# Patient Record
Sex: MALE | Race: WHITE | URBAN - METROPOLITAN AREA
[De-identification: names, ages, dates, MRNs, and addresses within clinical notes are randomized per-mention and may not be internally consistent; named-entity substitution may affect disease eponyms.]

---
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
## 2019-09-13 ENCOUNTER — HISTORICAL (OUTPATIENT)
Dept: RADIOLOGY | Facility: HOSPITAL | Age: 19
End: 2019-09-13

## 2020-09-03 ENCOUNTER — HISTORICAL (OUTPATIENT)
Dept: RADIOLOGY | Facility: HOSPITAL | Age: 20
End: 2020-09-03

## 2020-11-16 LAB
ABS NEUT (OLG): 2.04 X10(3)/MCL (ref 2.1–9.2)
APTT PPP: 30.9 SECOND(S) (ref 23.2–33.7)
BASOPHILS # BLD AUTO: 0 X10(3)/MCL (ref 0–0.2)
BASOPHILS NFR BLD AUTO: 1 %
EOSINOPHIL # BLD AUTO: 0.1 X10(3)/MCL (ref 0–0.9)
EOSINOPHIL NFR BLD AUTO: 2 %
ERYTHROCYTE [DISTWIDTH] IN BLOOD BY AUTOMATED COUNT: 12.6 % (ref 11.5–17)
HCT VFR BLD AUTO: 43.9 % (ref 42–52)
HGB BLD-MCNC: 14.5 GM/DL (ref 14–18)
INR PPP: 1 (ref 0–1.3)
LYMPHOCYTES # BLD AUTO: 1.7 X10(3)/MCL (ref 0.6–4.6)
LYMPHOCYTES NFR BLD AUTO: 39 %
MCH RBC QN AUTO: 30.5 PG (ref 27–31)
MCHC RBC AUTO-ENTMCNC: 33 GM/DL (ref 33–36)
MCV RBC AUTO: 92.2 FL (ref 80–94)
MONOCYTES # BLD AUTO: 0.5 X10(3)/MCL (ref 0.1–1.3)
MONOCYTES NFR BLD AUTO: 12 %
NEUTROPHILS # BLD AUTO: 2.04 X10(3)/MCL (ref 2.1–9.2)
NEUTROPHILS NFR BLD AUTO: 47 %
PLATELET # BLD AUTO: 214 X10(3)/MCL (ref 130–400)
PMV BLD AUTO: 10.5 FL (ref 9.4–12.4)
PROTHROMBIN TIME: 13.3 SECOND(S) (ref 11.1–13.7)
RBC # BLD AUTO: 4.76 X10(6)/MCL (ref 4.7–6.1)
WBC # SPEC AUTO: 4.4 X10(3)/MCL (ref 4.5–11.5)

## 2020-11-19 ENCOUNTER — HISTORICAL (OUTPATIENT)
Dept: SURGERY | Facility: HOSPITAL | Age: 20
End: 2020-11-19

## 2020-11-22 ENCOUNTER — HISTORICAL (OUTPATIENT)
Dept: SURGERY | Facility: HOSPITAL | Age: 20
End: 2020-11-22

## 2022-04-29 NOTE — OP NOTE
DATE OF SURGERY:    11/18/2020    SURGEON:  Steve Walls MD    PREOPERATIVE DIAGNOSIS:  Chronic obstructive adenotonsillitis.    POSTOPERATIVE DIAGNOSIS:  Chronic obstructive adenotonsillitis.    OPERATION PERFORMED:  KTP laser tonsillectomy, adenoidectomy.    DESCRIPTION OF PROCEDURE:  With proper consent information, patient brought to the operating room, placed on operating table in supine position.  Satisfactory endotracheal intubation general anesthesia, patient was placed asleep.  The tonsils and adenoids were removed with the KTP laser set on continuous wattage of power.  Strict KTP laser protocol was followed.  There was no bleeding.  He tolerated the procedure very well.  He was transferred back to recovery room awake, alert and responsive.        ______________________________  Steve Walls MD    [facility]  DD:  11/23/2020  Time:  04:03PM  DT:  11/23/2020  Time:  06:20PM  Job #:  [id number]

## 2025-05-06 ENCOUNTER — CLINICAL SUPPORT (OUTPATIENT)
Dept: RESPIRATORY THERAPY | Facility: HOSPITAL | Age: 25
End: 2025-05-06
Attending: SPECIALIST
Payer: COMMERCIAL

## 2025-05-06 ENCOUNTER — HOSPITAL ENCOUNTER (OUTPATIENT)
Dept: RADIOLOGY | Facility: HOSPITAL | Age: 25
Discharge: HOME | End: 2025-05-06
Attending: SPECIALIST
Payer: COMMERCIAL

## 2025-05-06 DIAGNOSIS — M70.51 PATELLAR BURSITIS, RIGHT: ICD-10-CM

## 2025-05-06 LAB
OHS QRS DURATION: 90 MS
OHS QTC CALCULATION: 391 MS

## 2025-05-06 PROCEDURE — 93010 ELECTROCARDIOGRAM REPORT: CPT | Mod: ,,, | Performed by: INTERNAL MEDICINE

## 2025-05-06 PROCEDURE — 93005 ELECTROCARDIOGRAM TRACING: CPT

## 2025-05-06 NOTE — DISCHARGE INSTRUCTIONS
Nothing to eat or drink after midnight.       WOUND CARE  INSTRUCTIONS     Az Deras M.D.     Orthopedic Surgery/Sports Medicine     [address] [facility], [place] [address]     [place] [id number]     Office: [phone]     REMOVE BANDAGE FROM SURGERY SITE 2 DAYS AFTER SURGERY   UNLESS INSTRUCTED OTHERWISE.    2. YOU MUST KEEP A STERILE BANDAGE OR GAUZE OVER SURGERY SITE UNTIL   THERE IS NO DRAINAGE FROM WOUND.    3. WASH INCISION WITH ANTIBACTERIAL SOAP AND WATER DAILY.    4. DO NOT SOAK INCISION IN BATHTUB.    5. REFRAIN FROM USING HYDROGEN PEROXIDE OR ANTIBIOTIC   OINTMENT UNLESS INSTRUCTED TO DO SO.    6. CALL DR DERAS OR NOTIFY STAFF IN CASE OF FEVER-GREATER THAN 101.5,   EXCESSIVE DRAINAGE FROM SURGERY SITE, REDNESS OR SEVERE PAIN.

## 2025-05-07 ENCOUNTER — ANESTHESIA EVENT (OUTPATIENT)
Dept: SURGERY | Facility: HOSPITAL | Age: 25
End: 2025-05-07
Payer: COMMERCIAL

## 2025-05-07 NOTE — ANESTHESIA PREPROCEDURE EVALUATION
05/07/2025  Fabricio Serrano is a 24 y.o., male.      Pre-op Assessment    I have reviewed the Patient Summary Reports.     I have reviewed the Nursing Notes. I have reviewed the NPO Status.   I have reviewed the Medications.     Review of Systems  Anesthesia Hx:             Denies Family Hx of Anesthesia complications.    Denies Personal Hx of Anesthesia complications.                    Social:  Non-Smoker, Alcohol Use       Hematology/Oncology:  Hematology Normal   Oncology Normal                                   EENT/Dental:  EENT/Dental Normal           Cardiovascular:  Cardiovascular Normal                  ECG has been reviewed.                            Pulmonary:  Pulmonary Normal                       Renal/:  Renal/ Normal                 Hepatic/GI:  Hepatic/GI Normal                    Musculoskeletal:  Musculoskeletal Normal                Neurological:  Neurology Normal                                      Endocrine:  Endocrine Normal            Dermatological:  Skin Normal    Psych:  Psychiatric Normal                    Physical Exam  General: Cooperative, Alert and Oriented    Airway:  Mallampati: II   Mouth Opening: Normal  TM Distance: Normal  Tongue: Normal  Neck ROM: Normal ROM    Dental:  Intact        Anesthesia Plan  Type of Anesthesia, risks & benefits discussed:    Anesthesia Type: Gen Supraglottic Airway  Intra-op Monitoring Plan: Standard ASA Monitors  Post Op Pain Control Plan: multimodal analgesia  Induction:  IV  Airway Plan: Direct  Informed Consent: Informed consent signed with the Patient and all parties understand the risks and agree with anesthesia plan.  All questions answered. Patient consented to blood products? Yes  ASA Score: 1    Ready For Surgery From Anesthesia Perspective.     .

## 2025-05-08 ENCOUNTER — HOSPITAL ENCOUNTER (OUTPATIENT)
Facility: HOSPITAL | Age: 25
Discharge: HOME | End: 2025-05-08
Attending: SPECIALIST | Admitting: SPECIALIST
Payer: COMMERCIAL

## 2025-05-08 ENCOUNTER — ANESTHESIA (OUTPATIENT)
Dept: SURGERY | Facility: HOSPITAL | Age: 25
End: 2025-05-08
Payer: COMMERCIAL

## 2025-05-08 VITALS
DIASTOLIC BLOOD PRESSURE: 70 MMHG | HEART RATE: 50 BPM | WEIGHT: 202 LBS | RESPIRATION RATE: 18 BRPM | SYSTOLIC BLOOD PRESSURE: 112 MMHG | BODY MASS INDEX: 27.36 KG/M2 | HEIGHT: 72 IN | TEMPERATURE: 98 F | OXYGEN SATURATION: 99 %

## 2025-05-08 DIAGNOSIS — M70.51 PATELLAR BURSITIS, RIGHT: ICD-10-CM

## 2025-05-08 PROCEDURE — 71000016 HC POSTOP RECOV ADDL HR: Performed by: SPECIALIST

## 2025-05-08 PROCEDURE — 37000008 HC ANESTHESIA 1ST 15 MINUTES: Performed by: SPECIALIST

## 2025-05-08 PROCEDURE — 36000709 HC OR TIME LEV III EA ADD 15 MIN: Performed by: SPECIALIST

## 2025-05-08 PROCEDURE — 63600175 PHARM REV CODE 636 W HCPCS: Performed by: SPECIALIST

## 2025-05-08 PROCEDURE — 63600175 PHARM REV CODE 636 W HCPCS: Performed by: ANESTHESIOLOGY

## 2025-05-08 PROCEDURE — 71000033 HC RECOVERY, INTIAL HOUR: Performed by: SPECIALIST

## 2025-05-08 PROCEDURE — 63600175 PHARM REV CODE 636 W HCPCS: Performed by: NURSE ANESTHETIST, CERTIFIED REGISTERED

## 2025-05-08 PROCEDURE — 71000015 HC POSTOP RECOV 1ST HR: Performed by: SPECIALIST

## 2025-05-08 PROCEDURE — 25000003 PHARM REV CODE 250: Performed by: SPECIALIST

## 2025-05-08 PROCEDURE — 37000009 HC ANESTHESIA EA ADD 15 MINS: Performed by: SPECIALIST

## 2025-05-08 PROCEDURE — 36000708 HC OR TIME LEV III 1ST 15 MIN: Performed by: SPECIALIST

## 2025-05-08 RX ORDER — SODIUM CHLORIDE 0.9 % (FLUSH) 0.9 %
10 SYRINGE (ML) INJECTION
Status: DISCONTINUED | OUTPATIENT
Start: 2025-05-08 | End: 2025-05-08

## 2025-05-08 RX ORDER — LIDOCAINE HYDROCHLORIDE 20 MG/ML
INJECTION INTRAVENOUS
Status: DISCONTINUED | OUTPATIENT
Start: 2025-05-08 | End: 2025-05-08

## 2025-05-08 RX ORDER — HYDROMORPHONE HYDROCHLORIDE 2 MG/ML
0.2 INJECTION, SOLUTION INTRAMUSCULAR; INTRAVENOUS; SUBCUTANEOUS EVERY 5 MIN PRN
Status: DISCONTINUED | OUTPATIENT
Start: 2025-05-08 | End: 2025-05-08

## 2025-05-08 RX ORDER — BUPIVACAINE HYDROCHLORIDE 5 MG/ML
INJECTION, SOLUTION EPIDURAL; INTRACAUDAL; PERINEURAL
Status: DISCONTINUED | OUTPATIENT
Start: 2025-05-08 | End: 2025-05-08 | Stop reason: HOSPADM

## 2025-05-08 RX ORDER — PROPOFOL 10 MG/ML
INJECTION, EMULSION INTRAVENOUS
Status: DISCONTINUED | OUTPATIENT
Start: 2025-05-08 | End: 2025-05-08

## 2025-05-08 RX ORDER — GLUCAGON 1 MG
1 KIT INJECTION
Status: DISCONTINUED | OUTPATIENT
Start: 2025-05-08 | End: 2025-05-08

## 2025-05-08 RX ORDER — DEXAMETHASONE SODIUM PHOSPHATE 4 MG/ML
INJECTION, SOLUTION INTRA-ARTICULAR; INTRALESIONAL; INTRAMUSCULAR; INTRAVENOUS; SOFT TISSUE
Status: DISCONTINUED | OUTPATIENT
Start: 2025-05-08 | End: 2025-05-08

## 2025-05-08 RX ORDER — KETOROLAC TROMETHAMINE 30 MG/ML
INJECTION, SOLUTION INTRAMUSCULAR; INTRAVENOUS
Status: DISCONTINUED | OUTPATIENT
Start: 2025-05-08 | End: 2025-05-08

## 2025-05-08 RX ORDER — ONDANSETRON HYDROCHLORIDE 2 MG/ML
INJECTION, SOLUTION INTRAVENOUS
Status: DISCONTINUED | OUTPATIENT
Start: 2025-05-08 | End: 2025-05-08

## 2025-05-08 RX ORDER — MIDAZOLAM HYDROCHLORIDE 1 MG/ML
INJECTION INTRAMUSCULAR; INTRAVENOUS
Status: DISCONTINUED | OUTPATIENT
Start: 2025-05-08 | End: 2025-05-08

## 2025-05-08 RX ORDER — SODIUM CHLORIDE, SODIUM LACTATE, POTASSIUM CHLORIDE, CALCIUM CHLORIDE 600; 310; 30; 20 MG/100ML; MG/100ML; MG/100ML; MG/100ML
INJECTION, SOLUTION INTRAVENOUS CONTINUOUS
Status: DISCONTINUED | OUTPATIENT
Start: 2025-05-08 | End: 2025-05-08 | Stop reason: HOSPADM

## 2025-05-08 RX ORDER — CEFADROXIL 500 MG/1
1 CAPSULE ORAL EVERY 12 HOURS
Qty: 14 CAPSULE | Refills: 0 | Status: SHIPPED | OUTPATIENT
Start: 2025-05-08 | End: 2025-05-15

## 2025-05-08 RX ORDER — HYDROCODONE BITARTRATE AND ACETAMINOPHEN 7.5; 325 MG/1; MG/1
1 TABLET ORAL EVERY 6 HOURS PRN
Qty: 14 TABLET | Refills: 0 | Status: SHIPPED | OUTPATIENT
Start: 2025-05-08

## 2025-05-08 RX ORDER — CEFAZOLIN SODIUM 2 G/50ML
2 SOLUTION INTRAVENOUS
Status: COMPLETED | OUTPATIENT
Start: 2025-05-08 | End: 2025-05-08

## 2025-05-08 RX ORDER — LIDOCAINE HYDROCHLORIDE 10 MG/ML
INJECTION, SOLUTION INFILTRATION; PERINEURAL
Status: DISCONTINUED | OUTPATIENT
Start: 2025-05-08 | End: 2025-05-08 | Stop reason: HOSPADM

## 2025-05-08 RX ORDER — FENTANYL CITRATE 50 UG/ML
INJECTION, SOLUTION INTRAMUSCULAR; INTRAVENOUS
Status: DISCONTINUED | OUTPATIENT
Start: 2025-05-08 | End: 2025-05-08

## 2025-05-08 RX ORDER — MUPIROCIN 20 MG/G
OINTMENT TOPICAL
Status: DISCONTINUED | OUTPATIENT
Start: 2025-05-08 | End: 2025-05-08 | Stop reason: HOSPADM

## 2025-05-08 RX ADMIN — HYDROMORPHONE HYDROCHLORIDE 0.2 MG: 2 INJECTION INTRAMUSCULAR; INTRAVENOUS; SUBCUTANEOUS at 09:05

## 2025-05-08 RX ADMIN — DEXAMETHASONE SODIUM PHOSPHATE 8 MG: 4 INJECTION, SOLUTION INTRA-ARTICULAR; INTRALESIONAL; INTRAMUSCULAR; INTRAVENOUS; SOFT TISSUE at 08:05

## 2025-05-08 RX ADMIN — PROPOFOL 200 MG: 10 INJECTION, EMULSION INTRAVENOUS at 08:05

## 2025-05-08 RX ADMIN — LIDOCAINE HYDROCHLORIDE 100 MG: 20 INJECTION, SOLUTION INTRAVENOUS at 08:05

## 2025-05-08 RX ADMIN — FENTANYL CITRATE 50 MCG: 50 INJECTION, SOLUTION INTRAMUSCULAR; INTRAVENOUS at 08:05

## 2025-05-08 RX ADMIN — SODIUM CHLORIDE, POTASSIUM CHLORIDE, SODIUM LACTATE AND CALCIUM CHLORIDE: 600; 310; 30; 20 INJECTION, SOLUTION INTRAVENOUS at 06:05

## 2025-05-08 RX ADMIN — ONDANSETRON 4 MG: 2 INJECTION INTRAMUSCULAR; INTRAVENOUS at 08:05

## 2025-05-08 RX ADMIN — FENTANYL CITRATE 25 MCG: 50 INJECTION, SOLUTION INTRAMUSCULAR; INTRAVENOUS at 09:05

## 2025-05-08 RX ADMIN — MUPIROCIN 1 G: 20 OINTMENT TOPICAL at 06:05

## 2025-05-08 RX ADMIN — PROPOFOL 100 MG: 10 INJECTION, EMULSION INTRAVENOUS at 08:05

## 2025-05-08 RX ADMIN — FENTANYL CITRATE 25 MCG: 50 INJECTION, SOLUTION INTRAMUSCULAR; INTRAVENOUS at 08:05

## 2025-05-08 RX ADMIN — KETOROLAC TROMETHAMINE 15 MG: 30 INJECTION, SOLUTION INTRAMUSCULAR at 08:05

## 2025-05-08 RX ADMIN — MIDAZOLAM 2 MG: 1 INJECTION INTRAMUSCULAR; INTRAVENOUS at 08:05

## 2025-05-08 RX ADMIN — CEFAZOLIN SODIUM 2 G: 2 SOLUTION INTRAVENOUS at 08:05

## 2025-05-08 NOTE — OP NOTE
Operative note     Date: 5/8/2025     Preop diagnosis:  Right knee prepatellar bursitis    Postop diagnosis: same    Procedure:  Excision of prepatellar bursa    Surgeon: Az Phillips M.D.    Assistant:     Anesthesia:  General    Complications: none    Blood loss: nil    Procedure in detail:  Informed consent was obtained.  Risks of the procedure was explained not excluding infection, bleeding, pain, scarring, neurovascular injury, recurrence.  This is a 24-year-old healthy male with a an enlarging mass firm but consistent with a prepatellar bursal sac over the knee.  He was given general anesthesia prepped and draped.  Anterior incision was made a very thickened bursal sac was excised over the patella and patella tendon.  Sent for specimen.  Bovie was used to cauterize wound was thoroughly irrigated and closed with 2-0 Vicryl staples sterile dressing applied no complications.    Az Phillips M.D.

## 2025-05-08 NOTE — ANESTHESIA PROCEDURE NOTES
IGel placement    Date/Time: 5/8/2025 8:23 AM    Performed by: Soledad De Jesus CRNA  Authorized by: Soledad De Jesus CRNA    Intubation:     Induction:  Intravenous    Intubated:  Postinduction    Mask Ventilation:  Easy mask    Attempts:  1    Attempted By:  CRNA    Difficult Airway Encountered?: No      Complications:  None    Airway Device:  Supraglottic airway/LMA    Airway Device Size:  4.0    Style/Cuff Inflation:  Uncuffed    Placement Verified By:  Capnometry    Complicating Factors:  None    Findings Post-Intubation:  BS equal bilateral

## 2025-05-08 NOTE — ANESTHESIA POSTPROCEDURE EVALUATION
Anesthesia Post Evaluation    Patient: Fabricio Serrano    Procedure(s) Performed: Procedure(s) (LRB):  EXCISION, PREPATELLAR BURSA (Right)    Final Anesthesia Type: general      Patient location during evaluation: PACU  Patient participation: Yes- Able to Participate  Level of consciousness: awake  Post-procedure vital signs: reviewed and stable  Pain management: adequate  Airway patency: patent  LISA mitigation strategies: Multimodal analgesia  PONV status at discharge: No PONV  Anesthetic complications: no      Cardiovascular status: hemodynamically stable  Respiratory status: unassisted, room air and spontaneous ventilation  Hydration status: euvolemic  Follow-up not needed.              Vitals Value Taken Time   /47 05/08/25 09:10   Temp  05/08/25 09:11   Pulse 54 05/08/25 09:11   Resp 10 05/08/25 09:11   SpO2 96 % 05/08/25 09:11   Vitals shown include unfiled device data.      No case tracking events are documented in the log.      Pain/Osmani Score: Osmani Score: 6 (5/8/2025  9:04 AM)

## 2025-05-08 NOTE — DISCHARGE SUMMARY
[facility] - Periop Services  Discharge Note  Short Stay    Procedure(s) (LRB):  EXCISION, PREPATELLAR BURSA (Right)      OUTCOME: Condition has improved and patient is now ready for discharge.    DISPOSITION: Home or Self Care    FINAL DIAGNOSIS:  <principal problem not specified>    FOLLOWUP: In clinic    DISCHARGE INSTRUCTIONS:  No discharge procedures on file.      Clinical Reference Documents Added to Patient Instructions         Document    WOUND CARE DISCHARGE INSTRUCTIONS (ENGLISH)            TIME SPENT ON DISCHARGE: 20 minutes

## 2025-05-08 NOTE — TRANSFER OF CARE
Anesthesia Transfer of Care Note    Patient: Fabricio Serrano    Procedure(s) Performed: Procedure(s) (LRB):  EXCISION, PREPATELLAR BURSA (Right)    Patient location: PACU    Anesthesia Type: general    Transport from OR: Transported from OR on room air with adequate spontaneous ventilation    Post pain: adequate analgesia    Post assessment: no apparent anesthetic complications    Post vital signs: stable    Level of consciousness: responds to stimulation and sedated    Nausea/Vomiting: no nausea/vomiting    Complications: none    Transfer of care protocol was followed      Last vitals: Visit Vitals  /67 (BP Location: Left arm, Patient Position: Sitting)   Pulse (!) 49   Temp 36.4 °C (97.6 °F) (Oral)   Resp 20   Ht 6' (1.829 m)   Wt 91.6 kg (202 lb)   SpO2 98%   BMI 27.40 kg/m²

## 2025-05-12 LAB — PSYCHE PATHOLOGY RESULT: NORMAL

## (undated) DEVICE — SOL IRR SOD CHL .9% POUR

## (undated) DEVICE — PAD ELECTROSURGICAL SPL W/CORD

## (undated) DEVICE — DURAPREP SURG SCRUB 26ML

## (undated) DEVICE — SUT 4.0 ETHILON

## (undated) DEVICE — GLOVE SENSICARE PI GRN 7

## (undated) DEVICE — PAD ABDOMINAL STERILE 8X10IN

## (undated) DEVICE — GLOVE SENSICARE NEOPRENE 6.5

## (undated) DEVICE — SUT 2-0 VICRYL / SH (J417)

## (undated) DEVICE — Device

## (undated) DEVICE — DRESSING N ADH OIL EMUL 3X3

## (undated) DEVICE — BANDAGE MATRIX HK LOOP 6IN 5YD

## (undated) DEVICE — PENCIL ZIP PEN EVAC 22MM 10FT

## (undated) DEVICE — BLADE SURG CARBON STEEL #10

## (undated) DEVICE — STAPLER SKIN ROTATING HEAD

## (undated) DEVICE — SUPPORT ULNA NERVE PROTECTOR

## (undated) DEVICE — SOCKINETTE IMPERVIOUS 12X48IN

## (undated) DEVICE — PAD KNEE POLAR XL

## (undated) DEVICE — SPONGE GZ WOVEN 12-PLY 4X4IN

## (undated) DEVICE — POSITIONER HEEL FOAM CONVOLTD

## (undated) DEVICE — BANDAGE ESMARK ELASTIC ST 6X9

## (undated) DEVICE — GLOVE SIGNATURE ESSNTL LTX 8

## (undated) DEVICE — GLOVE SENSICARE PI GRN 7.5

## (undated) DEVICE — GOWN POLY REINF BRTH SLV XL

## (undated) DEVICE — WRAP COBAN NL STRL 4INX5YD